# Patient Record
Sex: MALE | Race: WHITE | Employment: OTHER | URBAN - METROPOLITAN AREA
[De-identification: names, ages, dates, MRNs, and addresses within clinical notes are randomized per-mention and may not be internally consistent; named-entity substitution may affect disease eponyms.]

---

## 2019-01-01 ENCOUNTER — HOSPITAL ENCOUNTER (OUTPATIENT)
Age: 69
Setting detail: OUTPATIENT SURGERY
Discharge: HOME | End: 2019-10-16
Attending: INTERNAL MEDICINE | Admitting: INTERNAL MEDICINE
Payer: MEDICARE

## 2019-01-01 ENCOUNTER — TELEPHONE (OUTPATIENT)
Dept: ONCOLOGY | Age: 69
End: 2019-01-01

## 2019-01-01 ENCOUNTER — ANESTHESIA (OUTPATIENT)
Dept: ENDOSCOPY | Age: 69
End: 2019-01-01
Payer: MEDICARE

## 2019-01-01 ENCOUNTER — DOCUMENTATION ONLY (OUTPATIENT)
Dept: ONCOLOGY | Age: 69
End: 2019-01-01

## 2019-01-01 ENCOUNTER — HOSPITAL ENCOUNTER (OUTPATIENT)
Dept: CT IMAGING | Age: 69
Discharge: HOME | End: 2019-09-20
Attending: NURSE PRACTITIONER
Payer: MEDICARE

## 2019-01-01 ENCOUNTER — HOSPITAL ENCOUNTER (OUTPATIENT)
Dept: PET IMAGING | Age: 69
Discharge: HOME | End: 2019-10-11
Attending: INTERNAL MEDICINE
Payer: MEDICARE

## 2019-01-01 ENCOUNTER — ANESTHESIA EVENT (OUTPATIENT)
Dept: ENDOSCOPY | Age: 69
End: 2019-01-01
Payer: MEDICARE

## 2019-01-01 ENCOUNTER — APPOINTMENT (OUTPATIENT)
Dept: CT IMAGING | Age: 69
End: 2019-01-01
Attending: INTERNAL MEDICINE
Payer: MEDICARE

## 2019-01-01 ENCOUNTER — OFFICE VISIT (OUTPATIENT)
Dept: ONCOLOGY | Age: 69
End: 2019-01-01

## 2019-01-01 VITALS
HEIGHT: 72 IN | BODY MASS INDEX: 21.67 KG/M2 | TEMPERATURE: 95.5 F | RESPIRATION RATE: 28 BRPM | WEIGHT: 160 LBS | SYSTOLIC BLOOD PRESSURE: 127 MMHG | OXYGEN SATURATION: 94 % | HEART RATE: 70 BPM | DIASTOLIC BLOOD PRESSURE: 80 MMHG

## 2019-01-01 VITALS
DIASTOLIC BLOOD PRESSURE: 84 MMHG | SYSTOLIC BLOOD PRESSURE: 122 MMHG | RESPIRATION RATE: 24 BRPM | TEMPERATURE: 97.9 F | HEIGHT: 70 IN | OXYGEN SATURATION: 95 % | WEIGHT: 173 LBS | HEART RATE: 72 BPM | BODY MASS INDEX: 24.77 KG/M2

## 2019-01-01 VITALS — WEIGHT: 160 LBS | HEIGHT: 71 IN | BODY MASS INDEX: 22.4 KG/M2

## 2019-01-01 DIAGNOSIS — C34.92 CARCINOMA OF LEFT LUNG (HCC): Primary | Chronic | ICD-10-CM

## 2019-01-01 DIAGNOSIS — Z87.891 PERSONAL HISTORY OF NICOTINE DEPENDENCE: ICD-10-CM

## 2019-01-01 DIAGNOSIS — C34.92 CARCINOMA OF LEFT LUNG (HCC): Chronic | ICD-10-CM

## 2019-01-01 DIAGNOSIS — R06.09 DOE (DYSPNEA ON EXERTION): ICD-10-CM

## 2019-01-01 DIAGNOSIS — C34.02 MALIGNANT NEOPLASM OF LEFT MAIN BRONCHUS (HCC): ICD-10-CM

## 2019-01-01 PROCEDURE — 88172 CYTP DX EVAL FNA 1ST EA SITE: CPT

## 2019-01-01 PROCEDURE — 74011250636 HC RX REV CODE- 250/636: Performed by: NURSE ANESTHETIST, CERTIFIED REGISTERED

## 2019-01-01 PROCEDURE — 88112 CYTOPATH CELL ENHANCE TECH: CPT

## 2019-01-01 PROCEDURE — 88177 CYTP FNA EVAL EA ADDL: CPT

## 2019-01-01 PROCEDURE — 71250 CT THORAX DX C-: CPT

## 2019-01-01 PROCEDURE — 88173 CYTOPATH EVAL FNA REPORT: CPT

## 2019-01-01 PROCEDURE — 74011000250 HC RX REV CODE- 250: Performed by: NURSE ANESTHETIST, CERTIFIED REGISTERED

## 2019-01-01 PROCEDURE — 77030026438 HC STYL ET INTUB CARD -A: Performed by: ANESTHESIOLOGY

## 2019-01-01 PROCEDURE — 76060000033 HC ANESTHESIA 1 TO 1.5 HR: Performed by: INTERNAL MEDICINE

## 2019-01-01 PROCEDURE — 36415 COLL VENOUS BLD VENIPUNCTURE: CPT

## 2019-01-01 PROCEDURE — 76040000019: Performed by: INTERNAL MEDICINE

## 2019-01-01 PROCEDURE — 76040000020: Performed by: INTERNAL MEDICINE

## 2019-01-01 PROCEDURE — A9552 F18 FDG: HCPCS

## 2019-01-01 PROCEDURE — 88305 TISSUE EXAM BY PATHOLOGIST: CPT

## 2019-01-01 PROCEDURE — 88342 IMHCHEM/IMCYTCHM 1ST ANTB: CPT

## 2019-01-01 PROCEDURE — 88341 IMHCHEM/IMCYTCHM EA ADD ANTB: CPT

## 2019-01-01 PROCEDURE — G0297 LDCT FOR LUNG CA SCREEN: HCPCS

## 2019-01-01 RX ORDER — NEOSTIGMINE METHYLSULFATE 1 MG/ML
INJECTION INTRAVENOUS AS NEEDED
Status: DISCONTINUED | OUTPATIENT
Start: 2019-01-01 | End: 2019-01-01 | Stop reason: HOSPADM

## 2019-01-01 RX ORDER — LORAZEPAM 2 MG/ML
0.5 INJECTION INTRAMUSCULAR
Status: CANCELLED | OUTPATIENT
Start: 2019-01-01

## 2019-01-01 RX ORDER — DIPHENHYDRAMINE HYDROCHLORIDE 50 MG/ML
50 INJECTION, SOLUTION INTRAMUSCULAR; INTRAVENOUS AS NEEDED
Status: CANCELLED
Start: 2019-01-01

## 2019-01-01 RX ORDER — SODIUM CHLORIDE 9 MG/ML
10 INJECTION INTRAMUSCULAR; INTRAVENOUS; SUBCUTANEOUS AS NEEDED
Status: CANCELLED | OUTPATIENT
Start: 2019-01-01

## 2019-01-01 RX ORDER — ACETAMINOPHEN 325 MG/1
650 TABLET ORAL AS NEEDED
Status: CANCELLED
Start: 2019-01-01

## 2019-01-01 RX ORDER — HEPARIN 100 UNIT/ML
300-500 SYRINGE INTRAVENOUS AS NEEDED
Status: CANCELLED
Start: 2019-01-01

## 2019-01-01 RX ORDER — SODIUM CHLORIDE 0.9 % (FLUSH) 0.9 %
10 SYRINGE (ML) INJECTION AS NEEDED
Status: CANCELLED
Start: 2019-01-01

## 2019-01-01 RX ORDER — EPINEPHRINE 1 MG/ML
0.3 INJECTION, SOLUTION, CONCENTRATE INTRAVENOUS AS NEEDED
Status: CANCELLED | OUTPATIENT
Start: 2019-01-01

## 2019-01-01 RX ORDER — LIDOCAINE HYDROCHLORIDE AND EPINEPHRINE 10; 10 MG/ML; UG/ML
INJECTION, SOLUTION INFILTRATION; PERINEURAL
Status: DISCONTINUED
Start: 2019-01-01 | End: 2019-01-01 | Stop reason: WASHOUT

## 2019-01-01 RX ORDER — ONDANSETRON 2 MG/ML
8 INJECTION INTRAMUSCULAR; INTRAVENOUS ONCE
Status: CANCELLED | OUTPATIENT
Start: 2019-01-01

## 2019-01-01 RX ORDER — ONDANSETRON 2 MG/ML
8 INJECTION INTRAMUSCULAR; INTRAVENOUS AS NEEDED
Status: CANCELLED | OUTPATIENT
Start: 2019-01-01

## 2019-01-01 RX ORDER — DEXAMETHASONE SODIUM PHOSPHATE 4 MG/ML
8 INJECTION, SOLUTION INTRA-ARTICULAR; INTRALESIONAL; INTRAMUSCULAR; INTRAVENOUS; SOFT TISSUE ONCE
Status: CANCELLED | OUTPATIENT
Start: 2019-01-01

## 2019-01-01 RX ORDER — SODIUM CHLORIDE 9 MG/ML
25 INJECTION, SOLUTION INTRAVENOUS CONTINUOUS
Status: CANCELLED | OUTPATIENT
Start: 2019-01-01

## 2019-01-01 RX ORDER — ROCURONIUM BROMIDE 10 MG/ML
INJECTION, SOLUTION INTRAVENOUS AS NEEDED
Status: DISCONTINUED | OUTPATIENT
Start: 2019-01-01 | End: 2019-01-01 | Stop reason: HOSPADM

## 2019-01-01 RX ORDER — LIDOCAINE HYDROCHLORIDE 20 MG/ML
1-20 INJECTION, SOLUTION EPIDURAL; INFILTRATION; INTRACAUDAL; PERINEURAL ONCE
Status: DISCONTINUED | OUTPATIENT
Start: 2019-01-01 | End: 2019-01-01 | Stop reason: HOSPADM

## 2019-01-01 RX ORDER — ONDANSETRON 2 MG/ML
INJECTION INTRAMUSCULAR; INTRAVENOUS AS NEEDED
Status: DISCONTINUED | OUTPATIENT
Start: 2019-01-01 | End: 2019-01-01 | Stop reason: HOSPADM

## 2019-01-01 RX ORDER — GLYCOPYRROLATE 0.2 MG/ML
INJECTION INTRAMUSCULAR; INTRAVENOUS AS NEEDED
Status: DISCONTINUED | OUTPATIENT
Start: 2019-01-01 | End: 2019-01-01 | Stop reason: HOSPADM

## 2019-01-01 RX ORDER — LIDOCAINE HYDROCHLORIDE 20 MG/ML
JELLY TOPICAL ONCE
Status: DISCONTINUED | OUTPATIENT
Start: 2019-01-01 | End: 2019-01-01 | Stop reason: HOSPADM

## 2019-01-01 RX ORDER — SODIUM CHLORIDE 0.9 % (FLUSH) 0.9 %
10 SYRINGE (ML) INJECTION
Status: COMPLETED | OUTPATIENT
Start: 2019-01-01 | End: 2019-01-01

## 2019-01-01 RX ORDER — SUCCINYLCHOLINE CHLORIDE 20 MG/ML
INJECTION INTRAMUSCULAR; INTRAVENOUS AS NEEDED
Status: DISCONTINUED | OUTPATIENT
Start: 2019-01-01 | End: 2019-01-01 | Stop reason: HOSPADM

## 2019-01-01 RX ORDER — ALBUTEROL SULFATE 0.83 MG/ML
2.5 SOLUTION RESPIRATORY (INHALATION) AS NEEDED
Status: CANCELLED
Start: 2019-01-01

## 2019-01-01 RX ORDER — SODIUM CHLORIDE 9 MG/ML
50 INJECTION, SOLUTION INTRAVENOUS CONTINUOUS
Status: DISCONTINUED | OUTPATIENT
Start: 2019-01-01 | End: 2019-01-01 | Stop reason: HOSPADM

## 2019-01-01 RX ORDER — PHENYLEPHRINE HCL IN 0.9% NACL 0.4MG/10ML
SYRINGE (ML) INTRAVENOUS AS NEEDED
Status: DISCONTINUED | OUTPATIENT
Start: 2019-01-01 | End: 2019-01-01 | Stop reason: HOSPADM

## 2019-01-01 RX ORDER — HYDROCORTISONE SODIUM SUCCINATE 100 MG/2ML
100 INJECTION, POWDER, FOR SOLUTION INTRAMUSCULAR; INTRAVENOUS AS NEEDED
Status: CANCELLED | OUTPATIENT
Start: 2019-01-01

## 2019-01-01 RX ORDER — SODIUM CHLORIDE 0.9 % (FLUSH) 0.9 %
5-40 SYRINGE (ML) INJECTION AS NEEDED
Status: DISCONTINUED | OUTPATIENT
Start: 2019-01-01 | End: 2019-01-01 | Stop reason: HOSPADM

## 2019-01-01 RX ORDER — SODIUM CHLORIDE 0.9 % (FLUSH) 0.9 %
5-40 SYRINGE (ML) INJECTION EVERY 8 HOURS
Status: DISCONTINUED | OUTPATIENT
Start: 2019-01-01 | End: 2019-01-01 | Stop reason: HOSPADM

## 2019-01-01 RX ORDER — DEXAMETHASONE SODIUM PHOSPHATE 4 MG/ML
INJECTION, SOLUTION INTRA-ARTICULAR; INTRALESIONAL; INTRAMUSCULAR; INTRAVENOUS; SOFT TISSUE AS NEEDED
Status: DISCONTINUED | OUTPATIENT
Start: 2019-01-01 | End: 2019-01-01 | Stop reason: HOSPADM

## 2019-01-01 RX ORDER — SODIUM CHLORIDE 9 MG/ML
INJECTION, SOLUTION INTRAVENOUS
Status: DISCONTINUED | OUTPATIENT
Start: 2019-01-01 | End: 2019-01-01 | Stop reason: HOSPADM

## 2019-01-01 RX ORDER — LIDOCAINE HYDROCHLORIDE 20 MG/ML
INJECTION, SOLUTION EPIDURAL; INFILTRATION; INTRACAUDAL; PERINEURAL AS NEEDED
Status: DISCONTINUED | OUTPATIENT
Start: 2019-01-01 | End: 2019-01-01 | Stop reason: HOSPADM

## 2019-01-01 RX ORDER — PROPOFOL 10 MG/ML
INJECTION, EMULSION INTRAVENOUS AS NEEDED
Status: DISCONTINUED | OUTPATIENT
Start: 2019-01-01 | End: 2019-01-01 | Stop reason: HOSPADM

## 2019-01-01 RX ORDER — ONDANSETRON HYDROCHLORIDE 8 MG/1
8 TABLET, FILM COATED ORAL
Qty: 30 TAB | Refills: 4 | Status: SHIPPED | OUTPATIENT
Start: 2019-01-01

## 2019-01-01 RX ORDER — LIDOCAINE HYDROCHLORIDE 20 MG/ML
5 SOLUTION OROPHARYNGEAL ONCE
Status: DISCONTINUED | OUTPATIENT
Start: 2019-01-01 | End: 2019-01-01 | Stop reason: HOSPADM

## 2019-01-01 RX ADMIN — ROCURONIUM BROMIDE 10 MG: 10 SOLUTION INTRAVENOUS at 14:03

## 2019-01-01 RX ADMIN — GLYCOPYRROLATE 0.4 MG: 0.2 INJECTION, SOLUTION INTRAMUSCULAR; INTRAVENOUS at 14:50

## 2019-01-01 RX ADMIN — Medication 80 MCG: at 13:47

## 2019-01-01 RX ADMIN — ONDANSETRON HYDROCHLORIDE 4 MG: 2 INJECTION, SOLUTION INTRAMUSCULAR; INTRAVENOUS at 14:51

## 2019-01-01 RX ADMIN — SODIUM CHLORIDE: 900 INJECTION, SOLUTION INTRAVENOUS at 14:28

## 2019-01-01 RX ADMIN — Medication 80 MCG: at 14:04

## 2019-01-01 RX ADMIN — Medication 80 MCG: at 14:14

## 2019-01-01 RX ADMIN — SODIUM CHLORIDE: 900 INJECTION, SOLUTION INTRAVENOUS at 13:35

## 2019-01-01 RX ADMIN — Medication 80 MCG: at 13:48

## 2019-01-01 RX ADMIN — Medication 80 MCG: at 14:13

## 2019-01-01 RX ADMIN — Medication 80 MCG: at 14:08

## 2019-01-01 RX ADMIN — ROCURONIUM BROMIDE 10 MG: 10 SOLUTION INTRAVENOUS at 13:44

## 2019-01-01 RX ADMIN — LIDOCAINE HYDROCHLORIDE 60 MG: 20 INJECTION, SOLUTION EPIDURAL; INFILTRATION; INTRACAUDAL; PERINEURAL at 13:44

## 2019-01-01 RX ADMIN — DEXAMETHASONE SODIUM PHOSPHATE 8 MG: 4 INJECTION, SOLUTION INTRAMUSCULAR; INTRAVENOUS at 13:52

## 2019-01-01 RX ADMIN — Medication 80 MCG: at 13:44

## 2019-01-01 RX ADMIN — NEOSTIGMINE METHYLSULFATE 3 MG: 1 INJECTION, SOLUTION INTRAVENOUS at 14:50

## 2019-01-01 RX ADMIN — PROPOFOL 200 MG: 10 INJECTION, EMULSION INTRAVENOUS at 13:44

## 2019-01-01 RX ADMIN — ROCURONIUM BROMIDE 10 MG: 10 SOLUTION INTRAVENOUS at 14:33

## 2019-01-01 RX ADMIN — PROPOFOL 50 MG: 10 INJECTION, EMULSION INTRAVENOUS at 14:03

## 2019-01-01 RX ADMIN — Medication 10 ML: at 11:23

## 2019-01-01 RX ADMIN — PHENYLEPHRINE HYDROCHLORIDE 20 MCG/MIN: 10 INJECTION INTRAVENOUS at 14:26

## 2019-01-01 RX ADMIN — SUCCINYLCHOLINE CHLORIDE 140 MG: 20 INJECTION, SOLUTION INTRAMUSCULAR; INTRAVENOUS at 13:44

## 2019-01-01 RX ADMIN — Medication 80 MCG: at 14:07

## 2019-09-03 PROBLEM — Z72.0 TOBACCO USE: Chronic | Status: ACTIVE | Noted: 2019-01-01

## 2019-09-03 PROBLEM — R53.82 CHRONIC FATIGUE: Status: ACTIVE | Noted: 2019-01-01

## 2019-09-03 PROBLEM — R09.89 HYPERINFLATION OF LUNGS: Chronic | Status: ACTIVE | Noted: 2019-01-01

## 2019-09-03 PROBLEM — R91.8 INFILTRATE OF LEFT LUNG PRESENT ON CHEST X-RAY: Chronic | Status: ACTIVE | Noted: 2019-01-01

## 2019-09-26 PROBLEM — C34.92 CARCINOMA OF LEFT LUNG (HCC): Chronic | Status: ACTIVE | Noted: 2019-01-01

## 2019-09-30 PROBLEM — I35.8 AORTIC VALVE SCLEROSIS: Chronic | Status: ACTIVE | Noted: 2019-01-01

## 2019-10-03 NOTE — PROGRESS NOTES
Reason for Visit:  
Marycarmen Pack is a 77 y.o. male who is seen for new lung cancer Treatment History: Dx: Lung Cancer--left lung--bilateral hilar and mediastinal nodes--getting bronch with biopsy on 10/16/2019 History of Present Illness:  
Here for eval of lung mass. Seen by PCP and ordered low dose CT chest--long term smoker--found multiple masses in the left lung. Seen by Pulmonary this am--scheduled for bronch on 10/16. Here for further discussion. One week hx of headache--never had headaches before--no vision or strength/sensation changes. No problems with dyspnea, and very rare pain--pain is at the left lower rib margin--doesn't want anything stronger than acetaminophen or ibuprofen. No depression. Tremendous amount of loss in the past year. Past Medical History:  
Diagnosis Date  Alcohol use  Tobacco use No past surgical history on file. Social History Tobacco Use  Smoking status: Current Some Day Smoker Packs/day: 1.00 Years: 50.00 Pack years: 50.00  Smokeless tobacco: Never Used Substance Use Topics  Alcohol use: Not Currently Family History Problem Relation Age of Onset  Heart Attack Father   
      age 80  Stroke Father Current Outpatient Medications Medication Sig  
 fluticasone furoate-vilanterol (BREO ELLIPTA) 100-25 mcg/dose inhaler Take 1 Puff by inhalation daily.  albuterol (PROVENTIL HFA, VENTOLIN HFA, PROAIR HFA) 90 mcg/actuation inhaler Take 2 Puffs by inhalation every four (4) hours as needed for Wheezing. No current facility-administered medications for this visit. No Known Allergies Review of Systems: A complete review of systems was obtained, negative except as described above. Physical Exam:  
There were no vitals taken for this visit. ECOG PS: 0 General: No distress Eyes: PERRLA, anicteric sclerae HENT: Atraumatic, OP clear Neck: Supple Lymphatic: No cervical, supraclavicular, or inguinal adenopathy Respiratory: CTAB, normal respiratory effort CV: Normal rate, regular rhythm, no murmurs, no peripheral edema GI: Soft, nontender, nondistended, no masses, no hepatomegaly, no splenomegaly MS: Normal gait and station. Digits without clubbing or cyanosis. Skin: No rashes, ecchymoses, or petechiae. Normal temperature, turgor, and texture. Psych: Alert, oriented, appropriate affect, normal judgment/insight Results:  
 
Lab Results Component Value Date/Time WBC 15.2 (H) 09/03/2019 08:28 AM  
 HGB 14.6 09/03/2019 08:28 AM  
 HCT 41.7 09/03/2019 08:28 AM  
 PLATELET 819 17/23/8976 08:28 AM  
 MCV 97 09/03/2019 08:28 AM  
 ABS. NEUTROPHILS 12.1 (H) 09/03/2019 08:28 AM  
 
Lab Results Component Value Date/Time Sodium 141 09/03/2019 08:28 AM  
 Potassium 4.8 09/03/2019 08:28 AM  
 Chloride 104 09/03/2019 08:28 AM  
 CO2 24 09/03/2019 08:28 AM  
 Glucose 106 (H) 09/03/2019 08:28 AM  
 BUN 23 09/03/2019 08:28 AM  
 Creatinine 1.04 09/03/2019 08:28 AM  
 GFR est AA 85 09/03/2019 08:28 AM  
 GFR est non-AA 73 09/03/2019 08:28 AM  
 Calcium 9.8 09/03/2019 08:28 AM  
 
Lab Results Component Value Date/Time Bilirubin, total 0.4 09/03/2019 08:28 AM  
 ALT (SGPT) 14 09/03/2019 08:28 AM  
 AST (SGOT) 18 09/03/2019 08:28 AM  
 Alk. phosphatase 90 09/03/2019 08:28 AM  
 Protein, total 7.3 09/03/2019 08:28 AM  
 Albumin 4.0 09/03/2019 08:28 AM  
 
 
Low Dose Chest CT 9/20/2019 IMPRESSION:  
1. Large left hilar lung carcinoma adjacent the left upper lobe. 2. Encased and occluded left upper lobe bronchus. Probable encasement of the 
left main pulmonary artery and superior pulmonary vein. 3. Encasement of the left lower lobe bronchus. Extension into the left lower 
lobe. 4. Extensive brooks metastases, including contralateral mediastinal nodes and 
bilateral supraclavicular nodes. 5. Endobronchial metastasis/metastases in the left upper lobe. 6. Questionable subcentimeter right adrenal nodule will be better evaluated on 
staging CT. 7. No overt osseous metastases. 8. Extensive postobstructive endobronchial mucous plugging the left upper lobe. 9. Patchy airspace disease in the left upper lobe may be postobstructive 
atelectasis, postobstructive pneumonia, or less likely endobronchial or 
interstitial spread of tumor. Assessment:  
1) New Lung Cancer 2) Headaches Plan:  
1) Bronch with biopsy on 10/16--get PET for staging, back to see me in 3 weeks--this should give enough time for path to return. He will contact us if gets pain or dyspnea. 2) Head CT--claustrophobic and didn't want MRI. This would be part of staging anyway.  
 
Signed By: Sindhu Esqueda MD

## 2019-10-03 NOTE — PROGRESS NOTES
Tracy French is a 77 y.o. male new patient today referred by Yenifer Shaffer NP for \"CT scan showed a large left hilar lung carcinoma with metastasis to mediastinal lymph nodes. There is also encasement of the bronchial tubes and patchy air space disease present. \"   
 
 Patient met with a Pulmonologist in [place] today, he has been set up for biopsy on 10/16/19. He does not remember the doctors name. Has pain in L flank, SOB with exertion. He continues to smoke 6-8 cigarettes daily.

## 2019-10-16 NOTE — PERIOP NOTES
Consent obtained. VSS. Pt confirms NPO status since prior to midnight. Opportunity provided for questions and concerns. CT performed via Veran protocol. Tracker pads in place. Physician at bedside. Ready for procedure pending anesthesiology assessment.

## 2019-10-16 NOTE — DISCHARGE INSTRUCTIONS
Name: Tran Velásquez   : 1950   MRN: [id number]   Date: 10/16/2019 3:01 PM     BRONCHOSCOPY / EUS / EBUS DISCHARGE INSTRUCTIONS  Discomfort:  Sore throat- throat lozenges or warm salt water gargle  redness at IV site- apply warm compress to area; if redness or soreness persist- contact your physician  Gaseous discomfort- walking, belching will help relieve any discomfort  Should not operate a vehicle for at least 12 hours  You should not engage in an occupation involving machinery or appliances for rest of today  You should not drink alcoholic beverages for at least 12 hours  Avoid making any critical decisions for at least 24 hour  Blood tinged secretions - this should stop within 2-3 hours  DIET  Nothing by mouth- do not eat or drink for two hours. You may eat and drink after 4 pm   You may resume your regular diet - however -  remember your colon is empty and a heavy meal will produce gas. Avoid these foods:  vegetables, fried / greasy foods, carbonated drinks  MEDICATIONS:  Resume any preprocedure medications    ACTIVITY  You may resume your normal daily activities however it is recommended that you spend the remainder of the day resting -  avoid any strenuous activity. CALL M.D. ANY SIGN OF   Increasing pain, nausea, vomiting  New increased bleeding  Fever (chills)  Pain in chest area  Bloody discharge from nose or mouth  Shortness of breath  Bubbles under the skin around the collarbone. These may crackle and pop when you press on them. Coughing up more than ½ cup of blood.     Call [phone] office for the following  Results of procedure / biopsy in 3-5 days  Follow up appointment:  With Dr America Saunders and Dr Adilson Kirk (Oncology) as scheduled

## 2019-10-16 NOTE — DISCHARGE SUMMARY
Pulmonary    Status post EBUS and navigational bronchoscopy under general anesthesia    Station 7 (subcarinal LN appears to be positive for malignant cells)  Endobronchial bx of LLL lesion and navigational guided FNA of L hilar mass were positive for malignancy on rapid onsite evaluation (favoring small cell lung cancer)  Final path and flow cytometry pending    --Pt will follow up with pulmonary and oncology as scheduled    I have placed a call to his oncologist in [address] [place] [address] (Dr Taylor Romero to discuss bronchoscopy findings)   [phone] (office)    Catrina Aranda MD

## 2019-10-16 NOTE — ANESTHESIA PREPROCEDURE EVALUATION
Relevant Problems No relevant active problems Anesthetic History No history of anesthetic complications Review of Systems / Medical History Patient summary reviewed, nursing notes reviewed and pertinent labs reviewed Pulmonary Within defined limits Smoker Neuro/Psych Within defined limits Cardiovascular Within defined limits Valvular problems/murmurs: aortic stenosis Exercise tolerance: >4 METS Comments: · Left Ventricle: Normal cavity size and systolic function (ejection fraction normal). Upper normal wall thickness. Estimated left ventricular ejection fraction is 61 - 65%. Visually measured ejection fraction. Mild (grade 1) left ventricular diastolic dysfunction. · Aortic Valve: Aortic valve focal thickening present. · Mitral Valve: Trace mitral valve regurgitation is present. · Pulmonary Artery: There is no evidence of pulmonary hypertension GI/Hepatic/Renal 
Within defined limits Endo/Other Within defined limits Cancer Other Findings Physical Exam 
 
Airway Mallampati: II 
TM Distance: > 6 cm Neck ROM: normal range of motion Mouth opening: Normal 
 
 Cardiovascular Regular rate and rhythm,  S1 and S2 normal,  no murmur, click, rub, or gallop Dental 
 
Dentition: Edentulous Pulmonary Breath sounds clear to auscultation Abdominal 
GI exam deferred Other Findings Anesthetic Plan ASA: 3

## 2019-10-16 NOTE — ANESTHESIA POSTPROCEDURE EVALUATION
Post-Anesthesia Evaluation and Assessment Patient: Phoenix Caballero MRN: [id number]  SSN: [id number] YOB: 1950  Age: 77 y.o. Sex: male Cardiovascular Function/Vital Signs Visit Vitals /59 Pulse 82 Temp 35.3 °C (95.5 °F) Resp 23 Ht 5' 11.5\" (1.816 m) Wt 72.6 kg (160 lb) SpO2 95% BMI 22.00 kg/m² Patient is status post General anesthesia for Procedure(s): BRONCHOSCOPY NAVIGATIONAL 
ENDOSCOPIC BRONCHOSCOPY ULTRASOUND (EBUS) TRANSBRONCHIAL BIOPSY. Nausea/Vomiting: None Postoperative hydration reviewed and adequate. Pain: 
Pain Scale 1: Numeric (0 - 10) (10/16/19 1245) Pain Intensity 1: 0 (10/16/19 1245) Managed Neurological Status: At baseline Mental Status and Level of Consciousness: Alert and oriented to person, place, and time Pulmonary Status:  
O2 Device: CO2 nasal cannula (10/16/19 1510) Adequate oxygenation and airway patent Complications related to anesthesia: None Post-anesthesia assessment completed. No concerns Signed By: Stephani Hugo MD   
 October 16, 2019 Post-Anesthesia Evaluation and Assessment Patient: Phoenix Caballero MRN: [id number]  SSN: [id number] YOB: 1950  Age: 77 y.o. Sex: male Cardiovascular Function/Vital Signs Visit Vitals /59 Pulse 82 Temp 35.3 °C (95.5 °F) Resp 23 Ht 5' 11.5\" (1.816 m) Wt 72.6 kg (160 lb) SpO2 95% BMI 22.00 kg/m² Patient is status post General anesthesia for Procedure(s): BRONCHOSCOPY NAVIGATIONAL 
ENDOSCOPIC BRONCHOSCOPY ULTRASOUND (EBUS) TRANSBRONCHIAL BIOPSY. Nausea/Vomiting: None Postoperative hydration reviewed and adequate. Pain: 
Pain Scale 1: Numeric (0 - 10) (10/16/19 1245) Pain Intensity 1: 0 (10/16/19 1245) Managed Neurological Status: At baseline Mental Status and Level of Consciousness: Alert and oriented to person, place, and time Pulmonary Status: O2 Device: CO2 nasal cannula (10/16/19 1510) Adequate oxygenation and airway patent Complications related to anesthesia: None Post-anesthesia assessment completed. No concerns Signed By: Charlee Novoa MD   
 October 16, 2019 Procedure(s): BRONCHOSCOPY NAVIGATIONAL 
ENDOSCOPIC BRONCHOSCOPY ULTRASOUND (EBUS) TRANSBRONCHIAL BIOPSY. general 
 
Anesthesia Post Evaluation Cardiovascular status: hemodynamically stable Vitals Value Taken Time /59 10/16/2019  3:15 PM  
Temp 35.3 °C (95.5 °F) 10/16/2019  3:08 PM  
Pulse 82 10/16/2019  3:15 PM  
Resp 23 10/16/2019  3:15 PM  
SpO2 95 % 10/16/2019  3:15 PM

## 2019-10-16 NOTE — PROCEDURES
[facility]  Bronchoscopy Report    Procedure: Diagnostic bronchoscopy. Indication: Abnormal chest imaging    Consent/Treatment: Informed consent was obtained from the  patient after risks, benefits and alternatives were explained. Timeout verified the correct patient and correct procedure. Anesthesia:   General anesthesia was performed by anesthesiology        Procedure Details:   Veran protocol CT performed and targets mapped and procedure planned    Airway inspection showed 90% occlusion of LLL bronchus by tumor    Staging EBUS performed (with Dr Ilan Rosenthal available) and using a 21 g visishot needle the enlarged station 7 LN were sampled with malignant and lymph tissue noted on rapid onsite evaluation    -- The navigational bronchoscope was introduced through an endotracheal tube. -- The vocal cords n/a.  -- The trachea and adal were completely inspected and distal trachea and adal appeared to be normal.  -- The right-sided endobronchial anatomy was completely inspected and appeared to be normal.  -- The left-sided endobronchial anatomy was completely inspected and noted near total occlusion of the LLL airways.      Specimens:   Bronchial washings were sent for  cytology  Endobronchial biopsies from the LLL were sent for surgical pathology  Transbronchial needle aspiration from the L hilar region under navigational guidance were obtained and was sent for  cytology and surgical pathology    Rapid On-Site Evaluation: malignant cells favoring small cell lung cancer    Complications: none    Estimated Blood Loss: Minimal    Follow up final pathology  Pt will also follow up [place]

## 2019-10-17 NOTE — PROGRESS NOTES
chemocare information for tecentriq, Carboplatin and Etoposide reviewed with patient. Side effects/symptom management reviewed. Provided and reviewed chemo packet. Provided office contact information, including after hours contact. Questions answered. Consent obtained for palliative treatment.

## 2019-10-21 PROBLEM — R06.03 RESPIRATORY DISTRESS: Status: ACTIVE | Noted: 2019-01-01

## 2019-10-21 PROBLEM — J18.9 POSTOBSTRUCTIVE PNEUMONIA: Status: ACTIVE | Noted: 2019-01-01

## 2019-10-21 PROBLEM — C79.9 METASTATIC DISEASE (HCC): Status: ACTIVE | Noted: 2019-01-01

## 2019-10-22 PROBLEM — J96.01 ACUTE RESPIRATORY FAILURE WITH HYPOXIA (HCC): Status: ACTIVE | Noted: 2019-01-01

## 2019-10-22 NOTE — TELEPHONE ENCOUNTER
Beau Pereira from Case Management called to inform office that patient was admitted to [facility].

## 2019-10-23 PROBLEM — I48.91 ATRIAL FIBRILLATION WITH RAPID VENTRICULAR RESPONSE (HCC): Status: ACTIVE | Noted: 2019-01-01

## 2019-10-24 PROBLEM — D63.8 ANEMIA OF CHRONIC DISEASE: Chronic | Status: ACTIVE | Noted: 2019-01-01

## 2019-10-24 PROBLEM — F32.A DEPRESSION: Status: ACTIVE | Noted: 2019-01-01

## 2019-10-26 PROBLEM — R73.9 ELEVATED BLOOD SUGAR: Status: ACTIVE | Noted: 2019-01-01

## 2019-10-29 NOTE — PROGRESS NOTES
Patient inpatient room 132. Stopped by to see patient, he stated he was admitted for weakness. Patient states he is feeling slightly better, but is unsure about going home. During conversation patient stated his cancer has \"gone to far\" and treatment will not help. He has spoken with Dillon Oden [facility], and has accepted. He states they are looking to find a place for him to go after discharge. Denied needing any additional help at this time, pain is under control, and he is walking with PT. Thanked for visit.

## 2024-04-09 NOTE — ADDENDUM NOTE
Addended by: Bubba Looney on: 10/18/2019 03:24 PM 
 
 Modules accepted: Orders none per documentation

## (undated) DEVICE — SINGLE USE BIOPSY VALVE MAJ-210: Brand: SINGLE USE BIOPSY VALVE (STERILE)

## (undated) DEVICE — SYR 3ML LL TIP 1/10ML GRAD --

## (undated) DEVICE — 3M™ CUROS™ DISINFECTING CAP FOR NEEDLELESS CONNECTORS 270/CARTON 20 CARTONS/CASE CFF1-270: Brand: CUROS™

## (undated) DEVICE — NEEDLE NAVIGATION SYS 22GA L15MM DIA1.8MM NIT FLXIBLE SPIN

## (undated) DEVICE — FORCEPS ENDO DIA1.8MM SERR CUP ALWAYS-ON TIP TRACKED

## (undated) DEVICE — TRACKER PT NAVIGATE SPINPERC VPAD

## (undated) DEVICE — AIRLIFE™ ADULT CUSHION NASAL CANNULA 14 FOOT (4.3) CRUSH-RESISTANT OXYGEN TUBING, AND U/CONNECT-IT ADAPTER: Brand: AIRLIFE™

## (undated) DEVICE — PREP SKN CHLRAPRP SNGL 1.75ML --

## (undated) DEVICE — BAG SPEC BIOHZRD 10 X 10 IN --

## (undated) DEVICE — Z CONVERTED USE 2274305 CUFF BLD PRSS AD L SZ 12 FOR 32-43CM LIMB VLY SFT W/O TUBE

## (undated) DEVICE — SET ADMIN 16ML TBNG L100IN 2 Y INJ SITE IV PIGGY BK DISP

## (undated) DEVICE — SYR 5ML 1/5 GRAD LL NSAF LF --

## (undated) DEVICE — SOL IRRIGATION INJ NACL 0.9% 500ML BTL

## (undated) DEVICE — 1200 GUARD II KIT W/5MM TUBE W/O VAC TUBE: Brand: GUARDIAN

## (undated) DEVICE — BITE BLK ENDOSCP AD 54FR GRN POLYETH ENDOSCP W STRP SLD

## (undated) DEVICE — CONTAINER SPEC 20 ML LID NEUT BUFF FORMALIN 10 % POLYPR STS

## (undated) DEVICE — ELECTRODE,RADIOTRANSLUCENT,FOAM,3PK: Brand: MEDLINE

## (undated) DEVICE — KIT COLON W/ 1.1OZ LUB AND 2 END

## (undated) DEVICE — SYR LR LCK 1ML GRAD NSAF 30ML --

## (undated) DEVICE — TRAP SUC MUCOUS 70ML -- MEDICHOICE MEDLINE

## (undated) DEVICE — BASIN SPNG 32OZ BLU STRL --

## (undated) DEVICE — SINGLE USE SUCTION VALVE MAJ-209: Brand: SINGLE USE SUCTION VALVE (STERILE)

## (undated) DEVICE — QUILTED PREMIUM COMFORT UNDERPAD,EXTRA HEAVY: Brand: WINGS

## (undated) DEVICE — SYRINGE MED 10CC ECC TIP W/O NDL

## (undated) DEVICE — TUBING, SUCTION, 3/16" X 6', STRAIGHT: Brand: MEDLINE

## (undated) DEVICE — NEONATAL-ADULT SPO2 SENSOR: Brand: NELLCOR

## (undated) DEVICE — SOLIDIFIER FLUID 3000 CC ABSORB

## (undated) DEVICE — NDL FLTR TIP 5 MIC 18GX1.5IN --

## (undated) DEVICE — Device: Brand: MEDICAL ACTION INDUSTRIES

## (undated) DEVICE — CATHETER IV 20GA L1IN FEP STR HUB INTROCAN SFTY

## (undated) DEVICE — CATH SUC CTRL PRT TRIFLO 14FR --

## (undated) DEVICE — SINGLE USE ASPIRATION NEEDLE: Brand: SINGLE USE ASPIRATION NEEDLE

## (undated) DEVICE — SYRINGE MED 20ML STD CLR PLAS LUERSLIP TIP N CTRL DISP